# Patient Record
Sex: FEMALE | Race: WHITE | Employment: OTHER | URBAN - METROPOLITAN AREA
[De-identification: names, ages, dates, MRNs, and addresses within clinical notes are randomized per-mention and may not be internally consistent; named-entity substitution may affect disease eponyms.]

---

## 2018-05-14 PROBLEM — E66.01 SEVERE OBESITY (BMI 35.0-39.9) WITH COMORBIDITY (HCC): Status: ACTIVE | Noted: 2018-05-14

## 2018-05-14 PROBLEM — K57.30 DIVERTICULA OF INTESTINE: Status: ACTIVE | Noted: 2018-05-14

## 2018-06-18 PROBLEM — M19.071 ARTHRITIS OF RIGHT ANKLE: Status: ACTIVE | Noted: 2018-05-02

## 2018-06-18 PROBLEM — M25.571 SINUS TARSI SYNDROME OF RIGHT ANKLE: Status: ACTIVE | Noted: 2018-05-19

## 2018-06-18 PROBLEM — G47.33 OSA (OBSTRUCTIVE SLEEP APNEA): Status: ACTIVE | Noted: 2018-06-18

## 2018-06-18 PROBLEM — G89.29 CHRONIC PAIN OF RIGHT ANKLE: Status: ACTIVE | Noted: 2018-04-04

## 2018-06-18 PROBLEM — M21.6X1 PRONATION DEFORMITY OF RIGHT FOOT: Status: ACTIVE | Noted: 2018-05-19

## 2018-06-18 PROBLEM — R01.1 CARDIAC MURMUR: Status: ACTIVE | Noted: 2017-01-27

## 2018-06-18 PROBLEM — M25.571 CHRONIC PAIN OF RIGHT ANKLE: Status: ACTIVE | Noted: 2018-04-04

## 2018-10-20 PROBLEM — K56.609 SMALL BOWEL OBSTRUCTION (HCC): Status: ACTIVE | Noted: 2018-10-20

## 2019-05-14 PROBLEM — R60.0 EDEMA, LOWER EXTREMITY: Status: ACTIVE | Noted: 2018-04-04

## 2019-05-14 PROBLEM — K28.3 ACUTE GASTROJEJUNAL ANASTOMOTIC ULCER: Status: ACTIVE | Noted: 2018-09-29

## 2019-05-14 PROBLEM — T83.9XXA PROBLEM WITH VAGINAL PESSARY (HCC): Status: ACTIVE | Noted: 2018-08-03

## 2019-05-14 PROBLEM — L02.611 CELLULITIS AND ABSCESS OF TOE, RIGHT: Status: ACTIVE | Noted: 2018-11-20

## 2019-05-14 PROBLEM — L03.031 CELLULITIS AND ABSCESS OF TOE, RIGHT: Status: ACTIVE | Noted: 2018-11-20

## 2019-05-21 NOTE — PERIOP NOTES
PAT - SURGICAL PRE-ADMISSION INSTRUCTIONS    NAME:  Nina Goddard                                                          TODAY'S DATE:  5/21/2019    SURGERY DATE:  5/23/2019                                  SURGERY ARRIVAL TIME:   TBV    1. Do NOT eat or drink anything, including candy or gum, after MIDNIGHT on 5/22/19 , unless you have specific instructions from your Surgeon or Anesthesia Provider to do so. 2. No smoking on the day of surgery. 3. No alcohol 24 hours prior to the day of surgery. 4. No recreational drugs for one week prior to the day of surgery. 5. Leave all valuables, including money/purse, at home. 6. Remove all jewelry, nail polish, makeup (including mascara); no lotions, powders, deodorant, or perfume/cologne/after shave. 7. Glasses/Contact lenses and Dentures may be worn to the hospital.  They will be removed prior to surgery. 8. Call your doctor if symptoms of a cold or illness develop within 24 ours prior to surgery. 9. AN ADULT MUST DRIVE YOU HOME AFTER OUTPATIENT SURGERY. 10. If you are having an OUTPATIENT procedure, please make arrangements for a responsible adult to be with you for 24 hours after your surgery. 11. If you are admitted to the hospital, you will be assigned to a bed after surgery is complete. Normally a family member will not be able to see you until you are in your assigned bed. 15. Family is encouraged to accompany you to the hospital.  We ask visitors in the treatment area to be limited to ONE person at a time to ensure patient privacy. EXCEPTIONS WILL BE MADE AS NEEDED. 15. Children under 12 are discouraged from entering the treatment area and need to be supervised by an adult when in the waiting room. Special Instructions:     Take these medications the morning of surgery with a sip of water:  LEVOTHYROXINE, EFFEXOR, MICARDIS, STOP anticoagulants AT LEAST 1 WEEK PRIOR to your surgery or, follow other MD instructions:  ASPIRIN    Patient Prep:    shower with anti-bacterial soap    These surgical instructions were reviewed with PATIENT during the PAT PHONE CALL. Directions: On the morning of surgery, please go to the [facility]. Enter the building from the [facility] entrance, 1st floor (next to the Emergency Room entrance). Take the elevator to the 2nd floor. Sign in at the Registration Desk.     If you have any questions and/or concerns, please do not hesitate to call:  (Prior to the day of surgery)  [facility] unit:  [phone]  (Day of surgery)  [facility] unit:  [phone]

## 2019-05-22 ENCOUNTER — ANESTHESIA EVENT (OUTPATIENT)
Dept: SURGERY | Age: 74
End: 2019-05-22
Payer: MEDICARE

## 2019-05-23 ENCOUNTER — ANESTHESIA (OUTPATIENT)
Dept: SURGERY | Age: 74
End: 2019-05-23
Payer: MEDICARE

## 2019-05-23 ENCOUNTER — HOSPITAL ENCOUNTER (OUTPATIENT)
Age: 74
Setting detail: OUTPATIENT SURGERY
Discharge: HOME | End: 2019-05-23
Attending: UROLOGY | Admitting: UROLOGY
Payer: MEDICARE

## 2019-05-23 VITALS
RESPIRATION RATE: 16 BRPM | SYSTOLIC BLOOD PRESSURE: 111 MMHG | WEIGHT: 241 LBS | HEIGHT: 64 IN | BODY MASS INDEX: 41.15 KG/M2 | DIASTOLIC BLOOD PRESSURE: 61 MMHG | TEMPERATURE: 97.9 F | OXYGEN SATURATION: 92 % | HEART RATE: 77 BPM

## 2019-05-23 DIAGNOSIS — T83.9XXD PROBLEM WITH VAGINAL PESSARY, SUBSEQUENT ENCOUNTER: Primary | ICD-10-CM

## 2019-05-23 DIAGNOSIS — N30.00 ACUTE CYSTITIS WITHOUT HEMATURIA: ICD-10-CM

## 2019-05-23 LAB
GLUCOSE BLD STRIP.AUTO-MCNC: 115 MG/DL (ref 70–110)
GLUCOSE BLD STRIP.AUTO-MCNC: 153 MG/DL (ref 70–110)
HBA1C MFR BLD: 6.5 % (ref 4.2–5.6)

## 2019-05-23 PROCEDURE — 74011250636 HC RX REV CODE- 250/636

## 2019-05-23 PROCEDURE — 76060000033 HC ANESTHESIA 1 TO 1.5 HR: Performed by: UROLOGY

## 2019-05-23 PROCEDURE — 74011000258 HC RX REV CODE- 258: Performed by: UROLOGY

## 2019-05-23 PROCEDURE — 76010000149 HC OR TIME 1 TO 1.5 HR: Performed by: UROLOGY

## 2019-05-23 PROCEDURE — 77030018832 HC SOL IRR H20 ICUM -A: Performed by: UROLOGY

## 2019-05-23 PROCEDURE — 82962 GLUCOSE BLOOD TEST: CPT

## 2019-05-23 PROCEDURE — 77030002933 HC SUT MCRYL J&J -A: Performed by: UROLOGY

## 2019-05-23 PROCEDURE — 76210000016 HC OR PH I REC 1 TO 1.5 HR: Performed by: UROLOGY

## 2019-05-23 PROCEDURE — 74011000250 HC RX REV CODE- 250: Performed by: UROLOGY

## 2019-05-23 PROCEDURE — 74011250637 HC RX REV CODE- 250/637: Performed by: NURSE ANESTHETIST, CERTIFIED REGISTERED

## 2019-05-23 PROCEDURE — 77030008064 HC RNG RETRCTR COOP -B: Performed by: UROLOGY

## 2019-05-23 PROCEDURE — 77030008477 HC STYL SATN SLP COVD -A: Performed by: ANESTHESIOLOGY

## 2019-05-23 PROCEDURE — C1771 REP DEV, URINARY, W/SLING: HCPCS | Performed by: UROLOGY

## 2019-05-23 PROCEDURE — 76210000022 HC REC RM PH II 1.5 TO 2 HR: Performed by: UROLOGY

## 2019-05-23 PROCEDURE — 77030018673: Performed by: UROLOGY

## 2019-05-23 PROCEDURE — 77030005515 HC CATH URETH FOL14 BARD -B: Performed by: UROLOGY

## 2019-05-23 PROCEDURE — 77030002966 HC SUT PDS J&J -A: Performed by: UROLOGY

## 2019-05-23 PROCEDURE — 77030012961 HC IRR KT CYSTO/TUR ICUM -A: Performed by: UROLOGY

## 2019-05-23 PROCEDURE — 74011250636 HC RX REV CODE- 250/636: Performed by: NURSE ANESTHETIST, CERTIFIED REGISTERED

## 2019-05-23 PROCEDURE — 77030019927 HC TBNG IRR CYSTO BAXT -A: Performed by: UROLOGY

## 2019-05-23 PROCEDURE — 77030013079 HC BLNKT BAIR HGGR 3M -A: Performed by: ANESTHESIOLOGY

## 2019-05-23 PROCEDURE — 77030018836 HC SOL IRR NACL ICUM -A: Performed by: UROLOGY

## 2019-05-23 PROCEDURE — 77030010507 HC ADH SKN DERMBND J&J -B: Performed by: UROLOGY

## 2019-05-23 PROCEDURE — 77030034768 HC RETRCTR HK 2LD BLNT DISP COOP -A: Performed by: UROLOGY

## 2019-05-23 PROCEDURE — 74011250636 HC RX REV CODE- 250/636: Performed by: UROLOGY

## 2019-05-23 PROCEDURE — 83036 HEMOGLOBIN GLYCOSYLATED A1C: CPT

## 2019-05-23 PROCEDURE — 74011250637 HC RX REV CODE- 250/637: Performed by: UROLOGY

## 2019-05-23 PROCEDURE — 77030011267 HC ELECTRD BLD COVD -A: Performed by: UROLOGY

## 2019-05-23 PROCEDURE — 77030008683 HC TU ET CUF COVD -A: Performed by: ANESTHESIOLOGY

## 2019-05-23 PROCEDURE — 77030012884 HC SLV COMPR SCD MTEK -B: Performed by: UROLOGY

## 2019-05-23 PROCEDURE — 74011000250 HC RX REV CODE- 250

## 2019-05-23 PROCEDURE — 74011000258 HC RX REV CODE- 258

## 2019-05-23 PROCEDURE — 74011250637 HC RX REV CODE- 250/637: Performed by: ANESTHESIOLOGY

## 2019-05-23 DEVICE — TRANSOBTURATOR SLING SYSTEM WITH PRECISIONBLUE™ DESIGN
Type: IMPLANTABLE DEVICE | Site: BLADDER | Status: FUNCTIONAL
Brand: OBTRYX™ II SYSTEM - HALO

## 2019-05-23 RX ORDER — SODIUM CHLORIDE, SODIUM LACTATE, POTASSIUM CHLORIDE, CALCIUM CHLORIDE 600; 310; 30; 20 MG/100ML; MG/100ML; MG/100ML; MG/100ML
125 INJECTION, SOLUTION INTRAVENOUS CONTINUOUS
Status: DISCONTINUED | OUTPATIENT
Start: 2019-05-23 | End: 2019-05-23 | Stop reason: HOSPADM

## 2019-05-23 RX ORDER — FENTANYL CITRATE 50 UG/ML
INJECTION, SOLUTION INTRAMUSCULAR; INTRAVENOUS AS NEEDED
Status: DISCONTINUED | OUTPATIENT
Start: 2019-05-23 | End: 2019-05-23 | Stop reason: HOSPADM

## 2019-05-23 RX ORDER — LIDOCAINE HYDROCHLORIDE 20 MG/ML
INJECTION, SOLUTION EPIDURAL; INFILTRATION; INTRACAUDAL; PERINEURAL AS NEEDED
Status: DISCONTINUED | OUTPATIENT
Start: 2019-05-23 | End: 2019-05-23 | Stop reason: HOSPADM

## 2019-05-23 RX ORDER — SCOLOPAMINE TRANSDERMAL SYSTEM 1 MG/1
1 PATCH, EXTENDED RELEASE TRANSDERMAL
Status: DISCONTINUED | OUTPATIENT
Start: 2019-05-23 | End: 2019-05-23 | Stop reason: HOSPADM

## 2019-05-23 RX ORDER — HYDROMORPHONE HYDROCHLORIDE 1 MG/ML
0.5 INJECTION, SOLUTION INTRAMUSCULAR; INTRAVENOUS; SUBCUTANEOUS
Status: DISCONTINUED | OUTPATIENT
Start: 2019-05-23 | End: 2019-05-23 | Stop reason: HOSPADM

## 2019-05-23 RX ORDER — INSULIN LISPRO 100 [IU]/ML
INJECTION, SOLUTION INTRAVENOUS; SUBCUTANEOUS ONCE
Status: DISCONTINUED | OUTPATIENT
Start: 2019-05-23 | End: 2019-05-23 | Stop reason: HOSPADM

## 2019-05-23 RX ORDER — FAMOTIDINE 20 MG/1
20 TABLET, FILM COATED ORAL ONCE
Status: COMPLETED | OUTPATIENT
Start: 2019-05-23 | End: 2019-05-23

## 2019-05-23 RX ORDER — PROPOFOL 10 MG/ML
INJECTION, EMULSION INTRAVENOUS AS NEEDED
Status: DISCONTINUED | OUTPATIENT
Start: 2019-05-23 | End: 2019-05-23 | Stop reason: HOSPADM

## 2019-05-23 RX ORDER — SUCCINYLCHOLINE CHLORIDE 20 MG/ML
INJECTION INTRAMUSCULAR; INTRAVENOUS AS NEEDED
Status: DISCONTINUED | OUTPATIENT
Start: 2019-05-23 | End: 2019-05-23 | Stop reason: HOSPADM

## 2019-05-23 RX ORDER — EPHEDRINE SULFATE 50 MG/ML
INJECTION, SOLUTION INTRAVENOUS AS NEEDED
Status: DISCONTINUED | OUTPATIENT
Start: 2019-05-23 | End: 2019-05-23 | Stop reason: HOSPADM

## 2019-05-23 RX ORDER — FENTANYL CITRATE 50 UG/ML
50 INJECTION, SOLUTION INTRAMUSCULAR; INTRAVENOUS
Status: DISCONTINUED | OUTPATIENT
Start: 2019-05-23 | End: 2019-05-23 | Stop reason: HOSPADM

## 2019-05-23 RX ORDER — SODIUM CHLORIDE, SODIUM LACTATE, POTASSIUM CHLORIDE, CALCIUM CHLORIDE 600; 310; 30; 20 MG/100ML; MG/100ML; MG/100ML; MG/100ML
25 INJECTION, SOLUTION INTRAVENOUS CONTINUOUS
Status: DISCONTINUED | OUTPATIENT
Start: 2019-05-23 | End: 2019-05-23 | Stop reason: HOSPADM

## 2019-05-23 RX ORDER — LIDOCAINE HYDROCHLORIDE AND EPINEPHRINE 10; 10 MG/ML; UG/ML
INJECTION, SOLUTION INFILTRATION; PERINEURAL AS NEEDED
Status: DISCONTINUED | OUTPATIENT
Start: 2019-05-23 | End: 2019-05-23 | Stop reason: HOSPADM

## 2019-05-23 RX ORDER — LIDOCAINE HYDROCHLORIDE 10 MG/ML
0.1 INJECTION, SOLUTION EPIDURAL; INFILTRATION; INTRACAUDAL; PERINEURAL AS NEEDED
Status: DISCONTINUED | OUTPATIENT
Start: 2019-05-23 | End: 2019-05-23 | Stop reason: HOSPADM

## 2019-05-23 RX ORDER — ONDANSETRON 2 MG/ML
INJECTION INTRAMUSCULAR; INTRAVENOUS AS NEEDED
Status: DISCONTINUED | OUTPATIENT
Start: 2019-05-23 | End: 2019-05-23 | Stop reason: HOSPADM

## 2019-05-23 RX ORDER — LORAZEPAM 2 MG/ML
1 INJECTION INTRAMUSCULAR ONCE
Status: DISCONTINUED | OUTPATIENT
Start: 2019-05-23 | End: 2019-05-23 | Stop reason: HOSPADM

## 2019-05-23 RX ORDER — ONDANSETRON 2 MG/ML
4 INJECTION INTRAMUSCULAR; INTRAVENOUS ONCE
Status: DISCONTINUED | OUTPATIENT
Start: 2019-05-23 | End: 2019-05-23 | Stop reason: HOSPADM

## 2019-05-23 RX ORDER — MIDAZOLAM HYDROCHLORIDE 1 MG/ML
INJECTION, SOLUTION INTRAMUSCULAR; INTRAVENOUS AS NEEDED
Status: DISCONTINUED | OUTPATIENT
Start: 2019-05-23 | End: 2019-05-23 | Stop reason: HOSPADM

## 2019-05-23 RX ORDER — OXYCODONE AND ACETAMINOPHEN 5; 325 MG/1; MG/1
1 TABLET ORAL
Qty: 20 TAB | Refills: 0 | Status: SHIPPED | OUTPATIENT
Start: 2019-05-23 | End: 2019-05-23

## 2019-05-23 RX ADMIN — SODIUM CHLORIDE, SODIUM LACTATE, POTASSIUM CHLORIDE, AND CALCIUM CHLORIDE: 600; 310; 30; 20 INJECTION, SOLUTION INTRAVENOUS at 09:00

## 2019-05-23 RX ADMIN — EPHEDRINE SULFATE 10 MG: 50 INJECTION, SOLUTION INTRAVENOUS at 09:56

## 2019-05-23 RX ADMIN — GENTAMICIN SULFATE 384 MG: 40 INJECTION, SOLUTION INTRAMUSCULAR; INTRAVENOUS at 09:11

## 2019-05-23 RX ADMIN — ONDANSETRON 4 MG: 2 INJECTION INTRAMUSCULAR; INTRAVENOUS at 09:28

## 2019-05-23 RX ADMIN — MIDAZOLAM HYDROCHLORIDE 2 MG: 1 INJECTION, SOLUTION INTRAMUSCULAR; INTRAVENOUS at 09:07

## 2019-05-23 RX ADMIN — LIDOCAINE HYDROCHLORIDE 50 MG: 20 INJECTION, SOLUTION EPIDURAL; INFILTRATION; INTRACAUDAL; PERINEURAL at 09:12

## 2019-05-23 RX ADMIN — FAMOTIDINE 20 MG: 20 TABLET, FILM COATED ORAL at 08:08

## 2019-05-23 RX ADMIN — PROPOFOL 150 MG: 10 INJECTION, EMULSION INTRAVENOUS at 09:12

## 2019-05-23 RX ADMIN — SUCCINYLCHOLINE CHLORIDE 100 MG: 20 INJECTION INTRAMUSCULAR; INTRAVENOUS at 09:12

## 2019-05-23 RX ADMIN — EPHEDRINE SULFATE 20 MG: 50 INJECTION, SOLUTION INTRAVENOUS at 09:36

## 2019-05-23 RX ADMIN — EPHEDRINE SULFATE 20 MG: 50 INJECTION, SOLUTION INTRAVENOUS at 09:39

## 2019-05-23 RX ADMIN — SODIUM CHLORIDE, SODIUM LACTATE, POTASSIUM CHLORIDE, AND CALCIUM CHLORIDE 125 ML/HR: 600; 310; 30; 20 INJECTION, SOLUTION INTRAVENOUS at 11:50

## 2019-05-23 RX ADMIN — FENTANYL CITRATE 50 MCG: 50 INJECTION, SOLUTION INTRAMUSCULAR; INTRAVENOUS at 10:57

## 2019-05-23 RX ADMIN — FENTANYL CITRATE 100 MCG: 50 INJECTION, SOLUTION INTRAMUSCULAR; INTRAVENOUS at 09:12

## 2019-05-23 NOTE — PERIOP NOTES
Pre-Op Summary    Pt arrived via car with family/friend and is oriented to time, place, person and situation. Patient with steady gait with none assistive devices. Visit Vitals  /83 (BP 1 Location: Left arm, BP Patient Position: At rest)   Pulse 78   Temp 98.5 °F (36.9 °C)   Resp 16   Ht 5' 4\" (1.626 m)   Wt 109.3 kg (241 lb)   SpO2 100%   BMI 41.37 kg/m²       Peripheral IV located on Right hand . Patients belongings are located with family. Patient's point of contact is daughter in [place] and their contact number is: [phone]. They will be in the waiting room. They are able to receive medication information. They will be their ride home.

## 2019-05-23 NOTE — OP NOTES
Operative Note    5/23/2019    Patient: Ifrah Meadows               Sex: female             MRN: [id number]      YOB: 1945      Age:  66 y.o. Preoperative Diagnosis: n32.81 overactive bladder    Postoperative Diagnosis:  n32.81 overactive bladder    Surgeon: Luis Johnson) and Role:     * Isaac Camejo MD - Primary    Assistant: Etienne Holman MD    Anesthesia:  General    Indications for surgery: The patient is a very pleasant female with a history of mixed urinary incontinence. She has failed conservative measures and is now coming in for a cystoscopy and a Obtrix II sling. The risks and benefits were discussed with her in depth. She understands and agrees to proceed    Procedure:  Procedure(s):  Cystoscopy,obtryx II sling placement     Procedure in Detail:   The patient was identified and surgical site verification was performed prior to obtaining consent. The patient was brought to [facility]. Under adequate orotracheal anesthesia, the patient was positioned in dorsal lithotomy and prepped and draped as usual  A preoperative time out was performed addressing the anticipated surgical site, procedure, and safety precautions. Sequential compression stockings were applied. Prophylactic antibiotic (Gentamycin) was administered empirically. The patient was correctly identified. She was brought in the operating room and placed in the supine position. She was given general anesthesia. She was moved onto the operating table, placed in the dorsal lithotomy position. Her perineum, vagina, and lower abdomen were prepped and draped in normal sterile fashion. A time-out was performed and we all agreed. A Brock catheter was placed. A small incision was made in the vaginal mucosa in the mid urethra. Dissection was carried laterally on either side. We made 2 small incisions below the abductor longus.  We passed our Obtrix II  needle from our external incision into our internal incision and attached this to the Monarc mesh. This was done bilaterally. There was no evidence of any vaginal wall buttonholing. The mesh was adjusted to the appropriate tension using a metzembaum scissor. The outer  covering of mesh was removed with the metzembaum scissor in place. The excess mesh was excised. The vaginal incision was closed with a running 3-0 Vicryl. The external incisions were closed with interrupted 4-0 Vicryl. A cystoscopy was performed. The anterior urethra appeared normal. The bladder was inspected. There was no evidence of any bladder stones, tumors or lesions. The patient's bladder was drained. There was no evidence of any significant bleeding. She was awakened from anesthesia and taken to the recovery room in stable condition. The patient was awakened and taken to the recovery room in stable condition. Estimated Blood Loss:    Less than 50 ml          Implants:   Implant Name Type Inv. Item Serial No.  Lot No. LRB No. Used Action   SYS SLING MID-URETH/TRANSOBTR --  - [id number]  SYS SLING MID-URETH/TRANSOBTR --   [facility] [id number] N/A 1 Implanted     Specimens: * No specimens in log *     Drains: Brock to be removed in PACU before D/C           Complications:  None           Counts: Sponge and needle counts were correct times two. Plan: Ms. Francie Lei has an appointment to see me in 2 weeks  for early postop follow-up, at [address] [place] [address]. Katya Bolaños, [address] [place] [address] MD Willian  5/23/2019

## 2019-05-23 NOTE — DISCHARGE INSTRUCTIONS
Patient Education        Cystoscopy: What to Expect at [address] [facility]    A cystoscopy is a procedure that lets a doctor look inside of the bladder and the urethra. The urethra is the tube that carries urine from the bladder to outside the body. The doctor uses a thin, lighted tool called a cystoscope. Your bladder is filled with fluid. This stretches the bladder so that your doctor can look closely at the inside of your bladder. After the cystoscopy, your urethra may be sore at first, and it may burn when you urinate for the first few days after the procedure. You may feel the need to urinate more often, and your urine may be pink. These symptoms should get better in 1 or 2 days. You will probably be able to go back to most of your usual activities in 1 or 2 days. This care sheet gives you a general idea about how long it will take for you to recover. But each person recovers at a different pace. Follow the steps below to get better as quickly as possible. How can you care for yourself at home? Activity    · Rest when you feel tired. Getting enough sleep will help you recover.     · Try to walk each day. Start by walking a little more than you did the day before. Bit by bit, increase the amount you walk. Walking boosts blood flow and helps prevent pneumonia and constipation.     · Avoid strenuous activities, such as bicycle riding, jogging, weight lifting, or aerobic exercise, until your doctor says it is okay.     · Ask your doctor when you can drive again.     · Most people are able to return to work within 1 or 2 days after the procedure.     · You may shower and take baths as usual.     · Ask your doctor when it is okay for you to have sex. Diet    · You can eat your normal diet. If your stomach is upset, try bland, low-fat foods like plain rice, broiled chicken, toast, and yogurt.     · Drink plenty of fluids (unless your doctor tells you not to).    Medicines    · Take pain medicines exactly as directed. ? If the doctor gave you a prescription medicine for pain, take it as prescribed. ? If you are not taking a prescription pain medicine, ask your doctor if you can take an over-the-counter medicine.     · If you think your pain medicine is making you sick to your stomach:  ? Take your medicine after meals (unless your doctor has told you not to). ? Ask your doctor for a different pain medicine.     · If your doctor prescribed antibiotics, take them as directed. Do not stop taking them just because you feel better. You need to take the full course of antibiotics. Follow-up care is a key part of your treatment and safety. Be sure to make and go to all appointments, and call your doctor if you are having problems. It's also a good idea to know your test results and keep a list of the medicines you take. When should you call for help? Call 911 anytime you think you may need emergency care. For example, call if:    · You passed out (lost consciousness).     · You have severe trouble breathing.     · You have sudden chest pain and shortness of breath, or you cough up blood.     · You have severe belly pain.    Call your doctor now or seek immediate medical care if:    · You are sick to your stomach or cannot keep fluids down.     · Your urine is still red or you see blood clots after you have urinated several times.     · You have trouble passing urine or stool, especially if you have pain or swelling in your lower belly.     · You have signs of a blood clot, such as:  ? Pain in your calf, back of the knee, thigh, or groin. ? Redness and swelling in your leg or groin.     · You develop a fever or severe chills.     · You have pain in your back just below your rib cage. This is called flank pain.    Watch closely for changes in your health, and be sure to contact your doctor if:    · You have pain or burning when you urinate.  A burning feeling is normal for a day or two after the test, but call if it does not get better.     · You have a frequent urge to urinate but can pass only small amounts of urine.     · Your urine is pink, red, or cloudy, or smells bad. It is normal for the urine to have a pinkish color for a few days after the test, but call if it does not get better. Where can you learn more? Go to http://avtar-rm.info/. Enter P937 in the search box to learn more about \"Cystoscopy: What to Expect at Home. \"  Current as of: March 20, 2018  Content Version: 11.9  © 5705-0278 [employer]. Care instructions adapted under license by [employer] (which disclaims liability or warranty for this information). If you have questions about a medical condition or this instruction, always ask your healthcare professional. [address] any warranty or liability for your use of this information. DISCHARGE SUMMARY from Nurse    PATIENT INSTRUCTIONS:    After general anesthesia or intravenous sedation, for 24 hours or while taking prescription Narcotics:  · Limit your activities  · Do not drive and operate hazardous machinery  · Do not make important personal or business decisions  · Do  not drink alcoholic beverages  · If you have not urinated within 8 hours after discharge, please contact your surgeon on call.     Report the following to your surgeon:  · Excessive pain, swelling, redness or odor of or around the surgical area  · Temperature over 100.5  · Nausea and vomiting lasting longer than 4 hours or if unable to take medications  · Any signs of decreased circulation or nerve impairment to extremity: change in color, persistent  numbness, tingling, coldness or increase pain  · Any questions    What to do at Home:    These are general instructions for a healthy lifestyle:    No smoking/ No tobacco products/ Avoid exposure to second hand smoke  Surgeon General's Warning:  Quitting smoking now greatly reduces serious risk to your health. Obesity, smoking, and sedentary lifestyle greatly increases your risk for illness    A healthy diet, regular physical exercise & weight monitoring are important for maintaining a healthy lifestyle    You may be retaining fluid if you have a history of heart failure or if you experience any of the following symptoms:  Weight gain of 3 pounds or more overnight or 5 pounds in a week, increased swelling in our hands or feet or shortness of breath while lying flat in bed. Please call your doctor as soon as you notice any of these symptoms; do not wait until your next office visit. Recognize signs and symptoms of STROKE:    F-face looks uneven    A-arms unable to move or move unevenly    S-speech slurred or non-existent    T-time-call 911 as soon as signs and symptoms begin-DO NOT go       Back to bed or wait to see if you get better-TIME IS BRAIN. Warning Signs of HEART ATTACK     Call 911 if you have these symptoms:   Chest discomfort. Most heart attacks involve discomfort in the center of the chest that lasts more than a few minutes, or that goes away and comes back. It can feel like uncomfortable pressure, squeezing, fullness, or pain.  Discomfort in other areas of the upper body. Symptoms can include pain or discomfort in one or both arms, the back, neck, jaw, or stomach.  Shortness of breath with or without chest discomfort.  Other signs may include breaking out in a cold sweat, nausea, or lightheadedness. Don't wait more than five minutes to call 911 - MINUTES MATTER! Fast action can save your life. Calling 911 is almost always the fastest way to get lifesaving treatment. Emergency Medical Services staff can begin treatment when they arrive -- up to an hour sooner than if someone gets to the hospital by car. The discharge information has been reviewed with the patient. The patient verbalized understanding.   Discharge medications reviewed with the patient and appropriate educational materials and side effects teaching were provided. ___________________________________________________________________________________________________________________________________  Patient armband removed and given to patient to take home.   Patient was informed of the privacy risks if armband lost or stolen

## 2019-05-23 NOTE — ANESTHESIA POSTPROCEDURE EVALUATION
Procedure(s):  Cystoscopy,obtryx II sling placement. general    Anesthesia Post Evaluation      Multimodal analgesia: multimodal analgesia used between 6 hours prior to anesthesia start to PACU discharge  Patient location during evaluation: bedside  Patient participation: complete - patient participated  Level of consciousness: awake  Pain management: adequate  Airway patency: patent  Anesthetic complications: no  Cardiovascular status: acceptable  Respiratory status: acceptable  Hydration status: acceptable  Post anesthesia nausea and vomiting:  controlled      Vitals Value Taken Time   /98 5/23/2019 10:40 AM   Temp 36.6 °C (97.9 °F) 5/23/2019 10:36 AM   Pulse 92 5/23/2019 10:46 AM   Resp 12 5/23/2019 10:46 AM   SpO2 97 % 5/23/2019 10:46 AM   Vitals shown include unvalidated device data.

## 2019-05-23 NOTE — PERIOP NOTES
Recd care of pt from OR via stretcher. Resp even and unlabored. Attached to monitor. Pt with much anxiety. Encouraged to relax. OR, MAR and anesthesia report acknowledged. Brock cath patent. No c/o pain at this time. 8 [facility] No coverage per anesthesia. Will cont to monitor. 1130 Brock clamped so that pt will be able to void after removal.  Pt with no c/o pain or discomforts. Instructed to let us know if she starts experiencing too much pressure. Will cont to monitor. 1240  Vaginal packing removed. Brock cath removed. Small amt of bleeding noted. Tolerated well. Voided approx 150 ml of urine noted. Pt assisted back to POD 2 phase 2 recovery. Reminded that she will need to void again prior to discharge. Verbalized understanding.

## 2019-05-23 NOTE — PERIOP NOTES
Phase 2 Recovery Summary  Patient arrived to Phase 2   Report received from daughter  Vitals:    05/23/19 1135 05/23/19 1152 05/23/19 1155 05/23/19 1205   BP: 97/53 112/52  111/61   Pulse: 81 79 80 77   Resp: 16 13 11 16   Temp:       SpO2: 99% 97% 100% 92%   Weight:       Height:           oriented to time, place, person and situation    Lines and Drains  Peripheral Intravenous Line:   Peripheral IV 05/23/19 Right Hand (Active)   Site Assessment Clean, dry, & intact 5/23/2019 10:36 AM   Phlebitis Assessment 0 5/23/2019 10:36 AM   Infiltration Assessment 0 5/23/2019 10:36 AM   Dressing Status Clean, dry, & intact 5/23/2019 10:36 AM   Dressing Type Transparent;Tape 5/23/2019 10:36 AM   Hub Color/Line Status Blue; Infusing 5/23/2019 10:36 AM       Wound  Wound Vagina (Active)   Dressing Status Clean; Intact; Moist 5/23/2019 10:21 AM   Dressing Type Packing 5/23/2019 10:21 AM   Number of days: 0          Patient discharged to home   with daughter Urbano Chin

## 2019-05-23 NOTE — H&P
PREOPERATIVE HISTORY AND PHYSICAL EXAMINATION     Subjective:   History of Present Illness: Jaylon Tolliver is a 66 y.o. WHITE OR  female who presents today for preoperative history and physical exam.      Planned surgery: Obtryx II urethral sling placement  Location: [facility]  Surgery date: 5/23/19  Admitting physician: Frankey Harrison, MD  Admitting diagnosis:        Encounter Diagnosis   Name Primary?  DOUGLAS (stress urinary incontinence, female) Yes      Allergies:          Allergies   Allergen Reactions    Gabapentin Other (comments)    Atorvastatin Other (comments)    Cefazolin Rash       Other reaction(s): wheezing/sob    Demerol [Meperidine] Other (comments)       GI Distress    Keflex [Cephalexin] Unknown (comments)    Macrobid [Nitrofurantoin Monohyd/M-Cryst] Swelling    Morphine Other (comments)       GI Distress    Nitrofurantoin Macrocrystalline Other (comments)    Nsaids (Non-Steroidal Anti-Inflammatory Drug) Unknown (comments)    Pregabalin Not Reported This Time    Rosuvastatin Other (comments)    Septra [Sulfamethoprim Ds] Other (comments)       Diarrhea    Statins-Hmg-Coa Reductase Inhibitors Myalgia    Vancomycin Hives, Rash and Itching      Chief complaint:   Chief Complaint   Patient presents with    Overactive Bladder       HW obtryx II Sling placement 5/23/19 [place].      Past medical history:        Past Medical History:   Diagnosis Date    Abnormal CT of the abdomen      Anemia 1970    Anesthesia complication      Anxiety      Arthritis 1978-80    Arthropathy      Bladder diverticulum      Bladder trabeculation      Cataract      Chronic pain      Chronic pain 2005    Claustrophobia      Coagulation disorder (HCC)      Contact dermatitis and other eczema, due to unspecified cause 1979    Deficiency anemia      Depression      Diabetes ([address] 2008    Diabetes mellitus, type 2 ([address]      Diverticula of intestine      Esophageal reflux      Essential hypertension, benign      Fecal incontinence      GERD (gastroesophageal reflux disease) 1990    GI bleed      Heart murmur      History of blood transfusion      Hypercholesterolemia      Hypercholesterolemia 1989    Hypertension 2010    Hypothyroidism      Iron deficiency anemia, unspecified      Mastitis      Mononucleosis      Nausea with vomiting      Neuropathy      Nocturia      OAB (overactive bladder)      Obesity      MARCOS (obstructive sleep apnea)      Osteoarthritis      Osteoporosis      Panic attacks      Peripheral vascular disease (HCC)      PFD (pelvic floor dysfunction)      PONV (postoperative nausea and vomiting)      Post-void dribbling      Pure hypercholesterolemia      Recurrent UTI      Reflux esophagitis      Restless leg syndrome      Spider telangiectasia      Thyroid disease      Thyroid disease 1966    Urinary tract infection without hematuria      Varicella      Varicosities      Vision decreased        Past surgical history:         Past Surgical History:   Procedure Laterality Date    ABDOMEN SURGERY PROC UNLISTED   2004     [facility]    BREAST SURGERY PROCEDURE UNLISTED   2004     reduction    HX ABDOMINOPLASTY   2004    HX ARTHROPLASTY   05/31/2013    HX BREAST REDUCTION   2004    HX CHOLECYSTECTOMY   1987    HX COLONOSCOPY   3/16, 5/16    HX COLONOSCOPY   2016    HX ENDOSCOPY   2016    HX GASTRIC BYPASS   1990    HX GI   2015    HX HERNIA REPAIR   2001, 2002    HX KNEE REPLACEMENT   1/08, 5/08    HX ORTHOPAEDIC   2008     knee replacements    HX OSTEOTOMY   10/05/2012    HX ROTATOR CUFF REPAIR   2009    HX SHOULDER ARTHROSCOPY   04/27/2015    HX TOTAL ABDOMINAL HYSTERECTOMY   1975    HX UROLOGICAL   2010     incontinence    HX VEIN STRIPPING   10/2014    NEUROLOGICAL PROCEDURE UNLISTED   2009     neuropathy    VASCULAR SURGERY PROCEDURE UNLIST   2009     varicose veins      Social history:   Social History    Socioeconomic History    Marital status:        Spouse name: Not on file    Number of children: Not on file    Years of education: Not on file    Highest education level: Not on file   Tobacco Use    Smoking status: Former Smoker       Packs/day: 2.00       Years: 20.00       Pack years: 40.00       Types: Cigarettes       Last attempt to quit:        Years since quittin.0    Smokeless tobacco: Never Used   Substance and Sexual Activity    Alcohol use: No    Drug use: No    Sexual activity: Never         Family history:         Family History   Problem Relation Age of Onset    Diabetes Mother      Cancer Mother      Gall Bladder Disease Mother      Hypertension Mother      Stroke Mother      Diabetes Father      Heart Disease Father      Hypertension Father      Migraines Father      Migraines Sister        Current medications:   Current Outpatient Medications   Medication Sig Dispense Refill    magnesium oxide (MAG-OX) 400 mg tablet Take 400 mg by mouth.        ciclopirox (PENLAC) 8 % solution          BIOTIN PO Take  by mouth.        calcium carbonate (TUMS) 200 mg calcium (500 mg) chew Take 2 Tabs by mouth.        cyanocobalamin (VITAMIN B12) 500 mcg tablet Take 500 mcg by mouth.        venlafaxine (EFFEXOR) 50 mg tablet Take 50 mg by mouth.        cinnamon bark (CINNAMON) 500 mg cap Take 500 mg by mouth two (2) times a day.        HYDROcodone-acetaminophen (HYCET) 0.5-21.7 mg/mL oral solution 15 mL four (4) times daily as needed for Pain.        REQUIP 3 mg tab tab Take 3 mg by mouth three (3) times daily.        Comp Stocking,Knee,Long,X-Lrg misc 20-30 mm HG        Cholecalciferol, Vitamin D3, (VITAMIN D3) 2,000 unit cap capsule Take  by Mouth Twice Daily.        iron/vitamin B complex (GERITOL PO) Take 15 mL by mouth four (4) days a week.        furosemide (LASIX) 20 mg tablet 20 mg three (3) times daily.        fish oil-omega-3 fatty acids (FISH OIL) 300-1,000 mg cap 1,000 mg.        estradiol (ESTRACE) 0.01 % (0.1 mg/g) vaginal cream Insert 4 g into vagina every Monday, Wednesday, Friday. 42.5 g 6    metFORMIN (GLUCOPHAGE) 1,000 mg tablet Take 500 mg by mouth two (2) times daily (with meals).        telmisartan (MICARDIS) 20 mg tablet Take 20 mg by mouth daily.        aspirin 81 mg tablet Take 81 mg by mouth daily.        levothyroxine (SYNTHROID) 150 mcg tablet Take 125 mcg by mouth Daily (before breakfast).        ondansetron hcl (ZOFRAN) 4 mg tablet Take 4 mg by mouth every eight (8) hours as needed.          loperamide (IMODIUM A-D) 2 mg tablet Take 2 mg by mouth four (4) times daily as needed.          amoxicillin-clavulanate (AUGMENTIN) 500-125 mg per tablet Take 1 Tab by mouth two (2) times a day for 10 days. 20 Tab 0      Review of systems:   Constitutional: Fever: No  Skin: Rash: No  HEENT: Hearing difficulty: No  Eyes: Blurred vision: No  Cardiovascular: Chest pain: No  Respiratory: Shortness of breath: No  Gastrointestinal: Nausea/vomiting: Yes  Musculoskeletal: Back pain: Yes  Neurological: Weakness: No  Psychological: Memory loss: No  Comments/additional findings:       Objective:   Physical examination:  Visit Vitals  /78   Ht 5' 4\" (1.626 m)   Wt 237 lb (107.5 kg)   BMI 40.68 kg/m²      Constitutional: WDWN, Pleasant and appropriate affect, No acute distress. CV:  No peripheral swelling noted. RRR  Respiratory: No respiratory distress or difficulties. CTAB  Abdomen:  No abdominal masses or tenderness. No CVA tenderness. No hernias noted. Skin: No jaundice. Neuro/Psych:  Alert and oriented x 3. Affect appropriate.    Lymphatic:   No enlarged supraclavicular lymph nodes.       Prior Labs and Imaging:        Results for orders placed or performed in visit on 05/14/19   URINE C&S PRE-OP   Result Value Ref Range     FINAL REPORT Microbiology results (A)     AMB POC URINALYSIS DIP STICK AUTO W/O MICRO   Result Value Ref Range     Color (UA POC) Yellow       Clarity (UA POC) Clear       Glucose (UA POC) Negative Negative     Bilirubin (UA POC) Negative Negative     Ketones (UA POC) Negative Negative     Specific gravity (UA POC) 1.025 1.001 - 1.035     Blood (UA POC) Negative Negative     pH (UA POC) 5.0 4.6 - 8.0     Protein (UA POC) 1+ Negative     Urobilinogen (UA POC) 0.2 mg/dL 0.2 - 1     Nitrites (UA POC) Negative Negative     Leukocyte esterase (UA POC) Negative Negative      Assessment/Plan:   Sofy Pride will be brought to the operating room. We will proceed with planned surgery. All questions were answered. The indications, options, risks and benefits of the procedure have been explained in great detail. The complications discussed are in no way limited to the content of this note. The patient understands, and requests to proceed with surgery. Consent has been obtained. Cornelia Stern MD [address] [place] [address] Fellow    I was present and have independently reviewed the diagnosis and discussed the plan with Cornelia Stern MD [facility] [id number], [address] [facility] Fellow, and I agree. Britany Pierce MD   [facility]  [address] [place] [address]. [facility]      Date of Surgery Update: Soyf Pride was seen and examined. History and physical has been reviewed. The patient has been examined.  There have been no significant clinical changes since the completion of the originally dated History and Physical.    Signed By: Britany Pierce MD     May 23, 2019 8:59 AM

## 2019-05-23 NOTE — ANESTHESIA PREPROCEDURE EVALUATION
Relevant Problems   No relevant active problems       Anesthetic History     PONV     Comments: Scopolamine patch ordered      Review of Systems / Medical History  Patient summary reviewed, nursing notes reviewed and pertinent labs reviewed    Pulmonary    COPD    Sleep apnea: No treatment           Neuro/Psych         Psychiatric history     Cardiovascular    Hypertension              Exercise tolerance: <4 METS     GI/Hepatic/Renal     GERD      PUD     Endo/Other    Diabetes  Hypothyroidism  Morbid obesity and arthritis     Other Findings   Comments: Neuropathy, nocturia          Physical Exam    Airway  Mallampati: I  TM Distance: 4 - 6 cm  Neck ROM: normal range of motion   Mouth opening: Normal     Cardiovascular  Regular rate and rhythm,  S1 and S2 normal,  no murmur, click, rub, or gallop  Rhythm: regular  Rate: normal         Dental    Dentition: Edentulous     Pulmonary  Breath sounds clear to auscultation               Abdominal  GI exam deferred       Other Findings            Anesthetic Plan    ASA: 3  Anesthesia type: general          Induction: Intravenous  Anesthetic plan and risks discussed with: Patient

## 2021-08-03 PROBLEM — M19.90 OSTEOARTHRITIS: Status: RESOLVED | Noted: 2021-08-03 | Resolved: 2021-08-03

## 2021-08-03 PROBLEM — K21.9 ESOPHAGEAL REFLUX: Status: RESOLVED | Noted: 2021-08-03 | Resolved: 2021-08-03

## 2021-08-03 PROBLEM — I10 ESSENTIAL HYPERTENSION, BENIGN: Status: RESOLVED | Noted: 2021-08-03 | Resolved: 2021-08-03

## (undated) DEVICE — NEEDLE HYPO 22GA L1 1/2IN PIVOTING SHLD FOR LUERLOCK SYR

## (undated) DEVICE — STERILE POLYISOPRENE POWDER-FREE SURGICAL GLOVES: Brand: PROTEXIS

## (undated) DEVICE — SOLUTION IRRIG 3000ML H2O STRL BAG

## (undated) DEVICE — TRAY PREP DRY W/ PREM GLV 2 APPL 6 SPNG 2 UNDPD 1 OVERWRAP

## (undated) DEVICE — REM POLYHESIVE ADULT PATIENT RETURN ELECTRODE: Brand: VALLEYLAB

## (undated) DEVICE — SOLUTION IV 1000ML 0.9% SOD CHL

## (undated) DEVICE — SUTURE PDS II SZ 4-0 L27IN ABSRB VLT L17MM RB-1 1/2 CIR Z304H

## (undated) DEVICE — INTENDED FOR TISSUE SEPARATION, AND OTHER PROCEDURES THAT REQUIRE A SHARP SURGICAL BLADE TO PUNCTURE OR CUT.: Brand: BARD-PARKER SAFETY BLADES SIZE 15, STERILE

## (undated) DEVICE — TOWEL SURG W16XL26IN BLU NONFENESTRATED DLX ST 2 PER PK

## (undated) DEVICE — HOOK RETRCT DIA5MM SHRP E STAY DISP FOR LONE STAR SELF RET

## (undated) DEVICE — STERILE LATEX POWDER-FREE SURGICAL GLOVESWITH NITRILE COATING: Brand: PROTEXIS

## (undated) DEVICE — SYR 10ML CTRL LR LCK NSAF LF --

## (undated) DEVICE — TUBING IRRIG L77IN DIA0.241IN L BOR FOR CYSTO W/ NVENT

## (undated) DEVICE — SUTURE MCRYL SZ 4-0 L18IN ABSRB UD L19MM PS-2 3/8 CIR PRIM Y496G

## (undated) DEVICE — SUTURE PDS II SZ 3-0 L27IN ABSRB VLT RB-1 L17MM 1/2 CIR Z305H

## (undated) DEVICE — HOOK RETRCT 12MM E 2 FNGR FOR LONE STAR RETRCT SYS

## (undated) DEVICE — INSULATED BLADE ELECTRODE: Brand: EDGE

## (undated) DEVICE — MEDI-VAC NON-CONDUCTIVE SUCTION TUBING 6MM X 6.1M (20 FT.) L: Brand: CARDINAL HEALTH

## (undated) DEVICE — TRAY URIN CATH PED 16FR BLLN 5CC INDWL STR TIP INF CTRL

## (undated) DEVICE — 1016 S-DRAPE IRRIG POUCH 10/BOX: Brand: STERI-DRAPE™

## (undated) DEVICE — PACKING GZ W2INXL6FT WVN COT VAG RADPQ

## (undated) DEVICE — Y-TYPE TUR/BLADDER IRRIGATION SET, REGULATING CLAMP

## (undated) DEVICE — RING RETRCTR FIG 8 32.5X18.3CM -- PLAS STRL W/2 CATH CLIPS

## (undated) DEVICE — DEPAUL MINOR LITHOTOMY CDS: Brand: MEDLINE INDUSTRIES, INC.

## (undated) DEVICE — SLEEVE COMPR L THGH LEN WARP

## (undated) DEVICE — 1010 S-DRAPE TOWEL DRAPE 10/BX: Brand: STERI-DRAPE™

## (undated) DEVICE — 3M™ IOBAN™ 2 ANTIMICROBIAL INCISE DRAPE 6650EZ: Brand: IOBAN™ 2

## (undated) DEVICE — DERMABOND SKIN ADH 0.7ML -- DERMABOND ADVANCED 12/BX